# Patient Record
Sex: MALE | NOT HISPANIC OR LATINO | URBAN - METROPOLITAN AREA
[De-identification: names, ages, dates, MRNs, and addresses within clinical notes are randomized per-mention and may not be internally consistent; named-entity substitution may affect disease eponyms.]

---
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
## 2024-06-19 ENCOUNTER — APPOINTMENT (OUTPATIENT)
Dept: BEHAVIORAL HEALTH | Facility: CLINIC | Age: 19
End: 2024-06-19
Payer: COMMERCIAL

## 2024-06-19 DIAGNOSIS — F64.9 GENDER DYSPHORIA: ICD-10-CM

## 2024-06-19 DIAGNOSIS — F41.9 ANXIETY: ICD-10-CM

## 2024-06-19 PROCEDURE — 90791 PSYCH DIAGNOSTIC EVALUATION: CPT | Performed by: PSYCHOLOGIST

## 2024-06-19 PROCEDURE — 1036F TOBACCO NON-USER: CPT | Performed by: PSYCHOLOGIST
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
## 2024-07-08 ENCOUNTER — APPOINTMENT (OUTPATIENT)
Dept: BEHAVIORAL HEALTH | Facility: CLINIC | Age: 19
End: 2024-07-08
Payer: COMMERCIAL

## 2024-07-08 DIAGNOSIS — F41.9 ANXIETY: ICD-10-CM

## 2024-07-08 DIAGNOSIS — F64.9 GENDER DYSPHORIA: ICD-10-CM

## 2024-07-08 PROCEDURE — 1036F TOBACCO NON-USER: CPT | Performed by: PSYCHOLOGIST

## 2024-07-08 PROCEDURE — 90837 PSYTX W PT 60 MINUTES: CPT | Performed by: PSYCHOLOGIST

## 2024-07-08 NOTE — PROGRESS NOTES
4:00pm to 4:55pm  Met with client today for his individual session via telehealth.  Client was given an ADHD specific assessment interview.  Upon completion of the assessment client's interview was scored and client's assessment came back inconclusive.  Client will be given a B.A.A.R.S. IV to have his parents complete. Client will be encouraged to return the forms before his scheduled result session.

## 2024-07-23 ENCOUNTER — APPOINTMENT (OUTPATIENT)
Dept: BEHAVIORAL HEALTH | Facility: CLINIC | Age: 19
End: 2024-07-23
Payer: COMMERCIAL

## 2024-08-15 ENCOUNTER — APPOINTMENT (OUTPATIENT)
Dept: BEHAVIORAL HEALTH | Facility: CLINIC | Age: 19
End: 2024-08-15
Payer: COMMERCIAL

## 2024-08-15 DIAGNOSIS — F64.9 GENDER DYSPHORIA: ICD-10-CM

## 2024-08-15 DIAGNOSIS — F41.9 ANXIETY: ICD-10-CM

## 2024-08-15 PROCEDURE — 90832 PSYTX W PT 30 MINUTES: CPT | Performed by: PSYCHOLOGIST

## 2024-08-15 PROCEDURE — 1036F TOBACCO NON-USER: CPT | Performed by: PSYCHOLOGIST

## 2024-08-15 NOTE — PROGRESS NOTES
1:07pm to 1:15pm  Met with client today for their individual session via [employer].  Client was given the results of their ADHD testing.  Client's ADHD assessment interview was deemed to be inconclusive, so client was instructed via email to have his parents complete the BAARS IV to collect collateral information.  Client emailed this worker back initially stating that his relationship with his parents is strained and would I except their friend of 10 years who completed the form instead.  This worker did except that form, and asked that client complete 2 additional forms.  Client did not complete those forms, but instead turned in the parent forms today for scoring.    After scoring all the forms and reviewing all the data it was determined that no evidence to support a diagnosis of ADHD.  It should be noted that there were more indications of client's anxiety being the issue then ADHD.      Client was informed of the results and client was asked about the efficacy of the counseling they were receiving.  After client stated they felt counseling was going well and was helpful, it was recommended that client contemplate medication for anxiety to add to the treatment regimen to help with the current symptoms.  Recommendations were given to client at this time.    Client's case is currently being closed as their assessment has been completed.